# Patient Record
Sex: FEMALE | URBAN - METROPOLITAN AREA
[De-identification: names, ages, dates, MRNs, and addresses within clinical notes are randomized per-mention and may not be internally consistent; named-entity substitution may affect disease eponyms.]

---

## 2018-11-12 ENCOUNTER — APPOINTMENT (OUTPATIENT)
Dept: URBAN - METROPOLITAN AREA CLINIC 211 | Age: 63
Setting detail: DERMATOLOGY
End: 2018-11-12

## 2018-11-12 DIAGNOSIS — L81.4 OTHER MELANIN HYPERPIGMENTATION: ICD-10-CM

## 2018-11-12 DIAGNOSIS — L82.1 OTHER SEBORRHEIC KERATOSIS: ICD-10-CM

## 2018-11-12 DIAGNOSIS — L82.0 INFLAMED SEBORRHEIC KERATOSIS: ICD-10-CM

## 2018-11-12 PROBLEM — M12.9 ARTHROPATHY, UNSPECIFIED: Status: ACTIVE | Noted: 2018-11-12

## 2018-11-12 PROBLEM — J30.1 ALLERGIC RHINITIS DUE TO POLLEN: Status: ACTIVE | Noted: 2018-11-12

## 2018-11-12 PROCEDURE — OTHER REASSURANCE: OTHER

## 2018-11-12 PROCEDURE — 99242 OFF/OP CONSLTJ NEW/EST SF 20: CPT | Mod: 25

## 2018-11-12 PROCEDURE — 17110 DESTRUCT B9 LESION 1-14: CPT

## 2018-11-12 PROCEDURE — OTHER COUNSELING: OTHER

## 2018-11-12 PROCEDURE — OTHER LIQUID NITROGEN: OTHER

## 2018-11-12 PROCEDURE — OTHER MIPS QUALITY: OTHER

## 2018-11-12 ASSESSMENT — LOCATION DETAILED DESCRIPTION DERM
LOCATION DETAILED: RIGHT MEDIAL BREAST 5-6:00 REGION
LOCATION DETAILED: LEFT MEDIAL MALAR CHEEK
LOCATION DETAILED: RIGHT PROXIMAL DORSAL FOREARM

## 2018-11-12 ASSESSMENT — LOCATION SIMPLE DESCRIPTION DERM
LOCATION SIMPLE: LEFT CHEEK
LOCATION SIMPLE: RIGHT BREAST
LOCATION SIMPLE: RIGHT FOREARM

## 2018-11-12 ASSESSMENT — LOCATION ZONE DERM
LOCATION ZONE: FACE
LOCATION ZONE: TRUNK
LOCATION ZONE: ARM

## 2018-11-12 NOTE — PROCEDURE: MIPS QUALITY
Quality 110: Preventive Care And Screening: Influenza Immunization: Influenza Immunization Administered during Influenza season
Quality 431: Preventive Care And Screening: Unhealthy Alcohol Use - Screening: Patient screened for unhealthy alcohol use using a single question and scores less than 2 times per year
Quality 130: Documentation Of Current Medications In The Medical Record: Current Medications Documented
Quality 400a: One-Time Screening For Hepatitis C Virus (Hcv) For All Patients: Patient received one-time screening for HCV infection
Quality 131: Pain Assessment And Follow-Up: Pain assessment using a standardized tool is documented as negative, no follow-up plan required
Detail Level: Detailed

## 2018-11-12 NOTE — HPI: EVALUATION OF SKIN LESION(S)
How Severe Are Your Spot(S)?: moderate
Have Your Spot(S) Been Treated In The Past?: has not been treated
Hpi Title: Evaluation of a Skin Lesion
Additional History: Pain 0/10.
Family Member: Grandmother

## 2021-08-25 ENCOUNTER — IMPORTED ENCOUNTER (OUTPATIENT)
Dept: URBAN - NONMETROPOLITAN AREA CLINIC 1 | Facility: CLINIC | Age: 66
End: 2021-08-25

## 2021-08-25 PROBLEM — H52.4: Noted: 2021-08-25

## 2021-08-25 PROBLEM — H43.811: Noted: 2021-08-25

## 2021-08-25 PROBLEM — H25.813: Noted: 2021-08-25

## 2021-08-25 PROBLEM — H43.813: Noted: 2021-08-25

## 2021-08-25 PROCEDURE — 92015 DETERMINE REFRACTIVE STATE: CPT

## 2021-08-25 PROCEDURE — 92004 COMPRE OPH EXAM NEW PT 1/>: CPT

## 2021-08-25 NOTE — PATIENT DISCUSSION
Myopia / [place] / Presbyopia OU - Discussed diagnosis in detail with patient- New Glasses RX given today- Continue to monitor- RTC 1 year complete Cataracts OU- Discussed diagnosis in detail with patient- Discussed signs and symptoms of progression- Discussed UV protection- No treatment needed at this time - Continue to monitorPVD OD- Discussed findings of exam in detail with the patient. - The risk of retinal detachment in patients with PVDs was discussed with the patient and the warning signs of retinal detachment were carefully reviewed with the patient. - The patient was warned to return to the office or contact the ophthalmologist on call immediately if they experience signs of retinal detachment. - Continue to monitor Sebaceous cyst(1-2) OS / Ivin Jalen Rodríguezeddiagnosis in detail with patient - Stable at this time may consider referral to Dr. Tania Wilson if becomes bothersome - Continue to monitor

## 2021-09-23 NOTE — PATIENT DISCUSSION
Recommend Bilateral lower lid blepharoplasty trans conj laser, 1 pass  (discussed risks and benefits of sx. .. ).

## 2021-09-23 NOTE — PATIENT DISCUSSION
Recommend Mini lower lift, post-tragel, SMAS to chin/tear trough (discussed risks and benefits of sx. .. ). English

## 2022-04-09 ASSESSMENT — TONOMETRY
OD_IOP_MMHG: 16
OS_IOP_MMHG: 15

## 2022-04-09 ASSESSMENT — VISUAL ACUITY
OD_SC: 20/30+
OS_SC: 20/20-1

## 2022-09-29 ENCOUNTER — COMPREHENSIVE EXAM (OUTPATIENT)
Dept: URBAN - NONMETROPOLITAN AREA CLINIC 1 | Facility: CLINIC | Age: 67
End: 2022-09-29

## 2022-09-29 DIAGNOSIS — D31.31: ICD-10-CM

## 2022-09-29 DIAGNOSIS — H52.4: ICD-10-CM

## 2022-09-29 DIAGNOSIS — H05.20: ICD-10-CM

## 2022-09-29 PROCEDURE — 92014 COMPRE OPH EXAM EST PT 1/>: CPT

## 2022-09-29 PROCEDURE — 92499OP2 OPTOMAP RETINAL SCREENING BOTH EYES

## 2022-09-29 PROCEDURE — 92015 DETERMINE REFRACTIVE STATE: CPT

## 2022-09-29 ASSESSMENT — TONOMETRY
OD_IOP_MMHG: 16
OS_IOP_MMHG: 15

## 2022-09-29 ASSESSMENT — VISUAL ACUITY
OS_CC: 20/20
OD_CC: 20/25

## 2022-09-29 NOTE — PATIENT DISCUSSION
Recommend referral to [facility] for further evaluation, in addition to the nevus seen today. Patient agreed.

## 2022-09-29 NOTE — PATIENT DISCUSSION
Patient reports that doctors in the past have noticed it before, ruled out thyroid condition/issues. Hx of MRI of head ordered by PCP at [facility]), but doesn't know if it was the orbit, brain, or both.

## 2022-09-29 NOTE — PATIENT DISCUSSION
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 Patient agreed with referral recommendation.

## 2023-10-02 ENCOUNTER — ESTABLISHED PATIENT (OUTPATIENT)
Dept: URBAN - NONMETROPOLITAN AREA CLINIC 1 | Facility: CLINIC | Age: 68
End: 2023-10-02

## 2023-10-02 DIAGNOSIS — H52.4: ICD-10-CM

## 2023-10-02 PROCEDURE — 92014 COMPRE OPH EXAM EST PT 1/>: CPT

## 2023-10-02 PROCEDURE — 92015 DETERMINE REFRACTIVE STATE: CPT

## 2023-10-02 ASSESSMENT — TONOMETRY
OD_IOP_MMHG: 15
OS_IOP_MMHG: 15

## 2023-10-02 ASSESSMENT — VISUAL ACUITY
OS_CC: 20/25-1
OD_CC: 20/25-2

## 2024-10-03 ENCOUNTER — COMPREHENSIVE EXAM (OUTPATIENT)
Dept: URBAN - NONMETROPOLITAN AREA CLINIC 1 | Facility: CLINIC | Age: 69
End: 2024-10-03

## 2024-10-03 DIAGNOSIS — H52.4: ICD-10-CM

## 2024-10-03 PROCEDURE — 92014 COMPRE OPH EXAM EST PT 1/>: CPT

## 2024-10-03 PROCEDURE — 92015 DETERMINE REFRACTIVE STATE: CPT
